# Patient Record
Sex: MALE | Race: WHITE | Employment: UNEMPLOYED | URBAN - METROPOLITAN AREA
[De-identification: names, ages, dates, MRNs, and addresses within clinical notes are randomized per-mention and may not be internally consistent; named-entity substitution may affect disease eponyms.]

---

## 2019-04-20 ENCOUNTER — APPOINTMENT (OUTPATIENT)
Dept: GENERAL RADIOLOGY | Age: 59
End: 2019-04-20

## 2019-04-20 ENCOUNTER — HOSPITAL ENCOUNTER (EMERGENCY)
Age: 59
Discharge: HOME | End: 2019-04-20
Attending: EMERGENCY MEDICINE

## 2019-04-20 VITALS
OXYGEN SATURATION: 96 % | DIASTOLIC BLOOD PRESSURE: 73 MMHG | HEIGHT: 66 IN | WEIGHT: 150 LBS | HEART RATE: 88 BPM | SYSTOLIC BLOOD PRESSURE: 131 MMHG | TEMPERATURE: 97.1 F | BODY MASS INDEX: 24.11 KG/M2 | RESPIRATION RATE: 11 BRPM

## 2019-04-20 DIAGNOSIS — R55 NEAR SYNCOPE: Primary | ICD-10-CM

## 2019-04-20 LAB
A/G RATIO: 1.7 (ref 1.1–2.2)
ALBUMIN SERPL-MCNC: 4.7 G/DL (ref 3.4–5)
ALP BLD-CCNC: 109 U/L (ref 40–129)
ALT SERPL-CCNC: 28 U/L (ref 10–40)
ANION GAP SERPL CALCULATED.3IONS-SCNC: 12 MMOL/L (ref 3–16)
AST SERPL-CCNC: 17 U/L (ref 15–37)
BASOPHILS ABSOLUTE: 0 K/UL (ref 0–0.2)
BASOPHILS RELATIVE PERCENT: 0.4 %
BILIRUB SERPL-MCNC: 0.7 MG/DL (ref 0–1)
BUN BLDV-MCNC: 18 MG/DL (ref 7–20)
CALCIUM SERPL-MCNC: 9.7 MG/DL (ref 8.3–10.6)
CHLORIDE BLD-SCNC: 98 MMOL/L (ref 99–110)
CO2: 28 MMOL/L (ref 21–32)
CREAT SERPL-MCNC: 0.9 MG/DL (ref 0.8–1.3)
EKG ATRIAL RATE: 69 BPM
EKG DIAGNOSIS: NORMAL
EKG P AXIS: 33 DEGREES
EKG P-R INTERVAL: 132 MS
EKG Q-T INTERVAL: 392 MS
EKG QRS DURATION: 86 MS
EKG QTC CALCULATION (BAZETT): 420 MS
EKG R AXIS: 16 DEGREES
EKG T AXIS: 32 DEGREES
EKG VENTRICULAR RATE: 69 BPM
EOSINOPHILS ABSOLUTE: 0.4 K/UL (ref 0–0.6)
EOSINOPHILS RELATIVE PERCENT: 4.5 %
GFR AFRICAN AMERICAN: >60
GFR NON-AFRICAN AMERICAN: >60
GLOBULIN: 2.8 G/DL
GLUCOSE BLD-MCNC: 271 MG/DL (ref 70–99)
HCT VFR BLD CALC: 45.7 % (ref 40.5–52.5)
HEMOGLOBIN: 15.9 G/DL (ref 13.5–17.5)
LYMPHOCYTES ABSOLUTE: 1.5 K/UL (ref 1–5.1)
LYMPHOCYTES RELATIVE PERCENT: 17.7 %
MAGNESIUM: 2.4 MG/DL (ref 1.8–2.4)
MCH RBC QN AUTO: 31.9 PG (ref 26–34)
MCHC RBC AUTO-ENTMCNC: 34.8 G/DL (ref 31–36)
MCV RBC AUTO: 91.8 FL (ref 80–100)
MONOCYTES ABSOLUTE: 0.9 K/UL (ref 0–1.3)
MONOCYTES RELATIVE PERCENT: 10.5 %
NEUTROPHILS ABSOLUTE: 5.5 K/UL (ref 1.7–7.7)
NEUTROPHILS RELATIVE PERCENT: 66.9 %
PDW BLD-RTO: 12.9 % (ref 12.4–15.4)
PLATELET # BLD: 200 K/UL (ref 135–450)
PMV BLD AUTO: 7.1 FL (ref 5–10.5)
POTASSIUM SERPL-SCNC: 4.7 MMOL/L (ref 3.5–5.1)
RBC # BLD: 4.97 M/UL (ref 4.2–5.9)
SODIUM BLD-SCNC: 138 MMOL/L (ref 136–145)
TOTAL PROTEIN: 7.5 G/DL (ref 6.4–8.2)
TROPONIN: <0.01 NG/ML
TROPONIN: <0.01 NG/ML
WBC # BLD: 8.3 K/UL (ref 4–11)

## 2019-04-20 PROCEDURE — 83735 ASSAY OF MAGNESIUM: CPT

## 2019-04-20 PROCEDURE — 71046 X-RAY EXAM CHEST 2 VIEWS: CPT

## 2019-04-20 PROCEDURE — 93005 ELECTROCARDIOGRAM TRACING: CPT | Performed by: EMERGENCY MEDICINE

## 2019-04-20 PROCEDURE — 85025 COMPLETE CBC W/AUTO DIFF WBC: CPT

## 2019-04-20 PROCEDURE — 80053 COMPREHEN METABOLIC PANEL: CPT

## 2019-04-20 PROCEDURE — 2580000003 HC RX 258: Performed by: PHYSICIAN ASSISTANT

## 2019-04-20 PROCEDURE — 84484 ASSAY OF TROPONIN QUANT: CPT

## 2019-04-20 PROCEDURE — 93010 ELECTROCARDIOGRAM REPORT: CPT | Performed by: INTERNAL MEDICINE

## 2019-04-20 PROCEDURE — 99284 EMERGENCY DEPT VISIT MOD MDM: CPT

## 2019-04-20 RX ORDER — ATORVASTATIN CALCIUM 10 MG/1
10 TABLET, FILM COATED ORAL DAILY
COMMUNITY

## 2019-04-20 RX ORDER — 0.9 % SODIUM CHLORIDE 0.9 %
1000 INTRAVENOUS SOLUTION INTRAVENOUS ONCE
Status: COMPLETED | OUTPATIENT
Start: 2019-04-20 | End: 2019-04-20

## 2019-04-20 RX ORDER — CYCLOBENZAPRINE HCL 10 MG
10 TABLET ORAL 3 TIMES DAILY PRN
Qty: 15 TABLET | Refills: 0 | Status: SHIPPED | OUTPATIENT
Start: 2019-04-20

## 2019-04-20 RX ADMIN — SODIUM CHLORIDE 1000 ML: 9 INJECTION, SOLUTION INTRAVENOUS at 06:32

## 2019-04-20 NOTE — ED NOTES
20g IV placed in right AC. Blood work collected and sent to lab. Orthostatic VS obtained. Supine P: 68 BP: 131/70   Standing P: 84 BP: 142/76  Pt medicated per MAR.       Zuhair Howell RN  04/20/19 9491

## 2019-04-20 NOTE — ED NOTES
Pt back from x-ray. Placed on continuous pulse ox, tele monitoring, and BP set to cycle. Pt resting in bed with bed locked in lowest position with side rails up x2. Pt denies needs at this time, will continue to monitor.       Destiney Lamas RN  04/20/19 3593

## 2019-04-20 NOTE — ED NOTES
Bed: 08  Expected date:   Expected time:   Means of arrival:   Comments:  Martin Schaffer RN  04/20/19 0908

## 2019-04-20 NOTE — ED PROVIDER NOTES
Triage Chief Complaint:   Dizziness (pt in from home via [facility] reporting vss, bg 208 DM II, EKG SR. pt c/o feeling faint and dizzy for 5-10 mins no LOC )    [place]:  Jeremie Gomes is a 59 y.o. male that presents episode of lightheadedness and dizziness. Patient reports that he got up to use the restroom and after a few minutes of after getting up he felt like he was going to pass out. He felt very lightheaded and dizzy. He did not have any actual syncopal episode. He did manage to walk back upstairs to get his phone and called EMS. Symptoms only lasted 5-10 minutes at most and self resolved. He did not have any associated headache, vision changes, chest pain shortness of breath numbness or tingling. He does report that for the past several days she's been having left-sided neck and posterior shoulder/upper back pain. He reports that he's been using a lot recently and thought it was musculoskeletal related. It had been relieved with heating pads and ibuprofen. Denies any symptoms at this time. Has a history of hypertension, hyperlipidemia and diabetes. Denies any other recent travel or sick contacts. Denies fever, chills, cough, she has breath, chest pain, abdominal pain, nausea, vomiting, joint pain other than mentioned above, numbness, tingling, focal weakness or other associated signs or symptoms. ROS:  At least 10 systems reviewed and otherwise negative except as in the [address] PAST MEDICAL HISTORY/SURGICAL HISTORY    Past Medical History:   Diagnosis Date    Diabetes mellitus ([facility] [address]     Hemochromatosis     Hyperlipidemia     Hypertension      History reviewed. No pertinent surgical history.     CURRENT MEDICATIONS    Current Outpatient Rx   Medication Sig Dispense Refill    atorvastatin (LIPITOR) 10 MG tablet Take 10 mg by mouth daily      Escitalopram Oxalate (LEXAPRO PO) Take by mouth      Canagliflozin (INVOKANA PO) Take by mouth         ALLERGIES    No Known Allergies    FAMILY HISTORY/SOCIAL HISTORY    History reviewed. No pertinent family history. Social History     Socioeconomic History    Marital status: Single     Spouse name: None    Number of children: None    Years of education: None    Highest education level: None   Occupational History    None   Social Needs    Financial resource strain: None    Food insecurity:     Worry: None     Inability: None    Transportation needs:     Medical: None     Non-medical: None   Tobacco Use    Smoking status: Never Smoker    Smokeless tobacco: Never Used   Substance and Sexual Activity    Alcohol use: Yes    Drug use: Never    Sexual activity: None   Lifestyle    Physical activity:     Days per week: None     Minutes per session: None    Stress: None   Relationships    Social connections:     Talks on phone: None     Gets together: None     Attends Mosque service: None     Active member of club or organization: None     Attends meetings of clubs or organizations: None     Relationship status: None    Intimate partner violence:     Fear of current or ex partner: None     Emotionally abused: None     Physically abused: None     Forced sexual activity: None   Other Topics Concern    None   Social History Narrative    None       Nursing Notes Reviewed    Physical Exam:  ED Triage Vitals [04/20/19 0609]   Enc Vitals Group      BP (!) 146/77      Pulse 73      Resp 18      Temp 97.1 °F (36.2 °C)      Temp src       SpO2 98 %      Weight 150 lb (68 kg)      Height 5' 6\" (1.676 m)      Head Circumference       Peak Flow       Pain Score       Pain Loc       Pain Edu? Excl. in [address]? GENERAL APPEARANCE: Awake and alert. Cooperative. No acute distress. HEAD: Normocephalic. Atraumatic. EYES: EOM's grossly intact. Sclera anicteric. PERRLA  ENT: Mucous membranes are moist. Tolerates saliva. No trismus. Normal nose, patent airway, nonerythematous TMs and EACs  NECK: Supple. No meningismus. Trachea midline.  No midline ALT 28 10 - 40 U/L    AST 17 15 - 37 U/L    Globulin 2.8 g/dL   Magnesium   Result Value Ref Range    Magnesium 2.40 1.80 - 2.40 mg/dL   Troponin   Result Value Ref Range    Troponin <0.01 <0.01 ng/mL   Troponin   Result Value Ref Range    Troponin <0.01 <0.01 ng/mL   EKG 12 Lead   Result Value Ref Range    Ventricular Rate 69 BPM    Atrial Rate 69 BPM    P-R Interval 132 ms    QRS Duration 86 ms    Q-T Interval 392 ms    QTc Calculation (Bazett) 420 ms    P Axis 33 degrees    R Axis 16 degrees    T Axis 32 degrees    Diagnosis Normal sinus rhythmNormal ECG         Radiographs (if obtained):  XR CHEST STANDARD (2 VW)   Final Result   No acute process. Chart review shows recent radiographs:  No results found. EKG (if obtained): See Dr. Skinny Heredia note for EKG interpretation. MDM/ED COURSE:    Supervising physician: Dr. Steve Zapata    CBC is unremarkable. CMP shows glucose of 271. Magnesium 2.4. Troponin negative. CXR shows no acute cardiopulmonary abnormalities. EKG shows no acute ischemia. Orthostatic vitals: Supine P: 68 BP: 131/70, Standing P: 84 BP: 142/76. Patient given IV fluids while in the ED. Patient feeling better after IV fluids. Repeat 3 hour troponin is negative. Patient's VSS, neurologically intact. He will be discharged home and is to have close follow up with PCP. He's to return if new/worsening symptoms develop. Patient voiced understanding and is in agreement. I estimate there is LOW risk for INTRACRANIAL HEMORRHAGE, ISCHEMIC CVA,  MENINGITIS, ACUTE CORONARY SYNDROME, MALIGNANT DYSRHYTHMIA, PULMONARY EMBOLISM, PNEUMONIA or SEPSIS, thus I consider the discharge disposition reasonable. Roro Victor (or their surrogate) and I have discussed the diagnosis and risks, and we agree with discharging home with close follow-up. We also discussed returning to the Emergency Department immediately if new or worsening symptoms occur.  We have discussed the symptoms which are most concerning

## 2019-04-20 NOTE — ED NOTES
Pt Discharged in stable condition, VSS, no signs of distress, discharge instructions and meds reviewed. Pt verbalizes understanding and states no further questions or concerns unaddressed.        Wells Claude, RN  04/20/19 3643

## 2019-04-20 NOTE — ED PROVIDER NOTES
I independently examined and evaluated [facility]. In brief history of present illness revealed 58-year-old male who presents with an episode of near syncope. Patient states for the last 2 days he has felt muscle pain to the left posterior lateral neck and left trapezius area. He woke up this morning and went to the bathroom but states he had significant pain to the left trapezius area. He felt lightheaded like he was going to pass out. He states his vision was blurry and he felt disoriented. He was able to get back to his room to get his phone and call 911. States symptoms lasted 7-10 minutes and resolved without intervention. Blood sugar was not low for EMS. He had no other associated symptoms and denies chest pain, palpitations, shortness of breath, focal weakness or numbness, nausea, diaphoresis. Physical examination revealed a well-appearing male who is awake, alert and in no acute distress. Alert with fluent speech. No facial drooping. Regular rate and rhythm. Clear to auscultation bilaterally. Skin warm and dry. Abdomen soft, nontender, nondistended without rebound. Tenderness to palpation to the left trapezius.      I have reviewed and interpreted all of the currently available lab results from this visit:  Results for orders placed or performed during the hospital encounter of 04/20/19   CBC Auto Differential   Result Value Ref Range    WBC 8.3 4.0 - 11.0 K/uL    RBC 4.97 4.20 - 5.90 M/uL    Hemoglobin 15.9 13.5 - 17.5 g/dL    Hematocrit 45.7 40.5 - 52.5 %    MCV 91.8 80.0 - 100.0 fL    MCH 31.9 26.0 - 34.0 pg    MCHC 34.8 31.0 - 36.0 g/dL    RDW 12.9 12.4 - 15.4 %    Platelets 352 185 - 050 K/uL    MPV 7.1 5.0 - 10.5 fL    Neutrophils % 66.9 %    Lymphocytes % 17.7 %    Monocytes % 10.5 %    Eosinophils % 4.5 %    Basophils % 0.4 %    Neutrophils # 5.5 1.7 - 7.7 K/uL    Lymphocytes # 1.5 1.0 - 5.1 K/uL    Monocytes # 0.9 0.0 - 1.3 K/uL    Eosinophils # 0.4 0.0 - 0.6 K/uL Basophils # 0.0 0.0 - 0.2 K/uL   Comprehensive Metabolic Panel   Result Value Ref Range    Sodium 138 136 - 145 mmol/L    Potassium 4.7 3.5 - 5.1 mmol/L    Chloride 98 (L) 99 - 110 mmol/L    CO2 28 21 - 32 mmol/L    Anion Gap 12 3 - 16    Glucose 271 (H) 70 - 99 mg/dL    BUN 18 7 - 20 mg/dL    CREATININE 0.9 0.8 - 1.3 mg/dL    GFR Non-African American >60 >60    GFR African American >60 >60    Calcium 9.7 8.3 - 10.6 mg/dL    Total Protein 7.5 6.4 - 8.2 g/dL    Alb 4.7 3.4 - 5.0 g/dL    Albumin/Globulin Ratio 1.7 1.1 - 2.2    Total Bilirubin 0.7 0.0 - 1.0 mg/dL    Alkaline Phosphatase 109 40 - 129 U/L    ALT 28 10 - 40 U/L    AST 17 15 - 37 U/L    Globulin 2.8 g/dL   Magnesium   Result Value Ref Range    Magnesium 2.40 1.80 - 2.40 mg/dL   Troponin   Result Value Ref Range    Troponin <0.01 <0.01 ng/mL   Troponin   Result Value Ref Range    Troponin <0.01 <0.01 ng/mL   EKG 12 Lead   Result Value Ref Range    Ventricular Rate 69 BPM    Atrial Rate 69 BPM    P-R Interval 132 ms    QRS Duration 86 ms    Q-T Interval 392 ms    QTc Calculation (Bazett) 420 ms    P Axis 33 degrees    R Axis 16 degrees    T Axis 32 degrees    Diagnosis Normal sinus rhythmNormal ECG      XR CHEST STANDARD (2 VW) (Final result)   Result time 04/20/19 06:58:54   Final result by Virgilio Wang MD (04/20/19 06:58:54)                Impression:    No acute process. EKG per my interpretation shows normal sinus rhythm at 69 bpm.  Normal axis. Normal intervals. No ectopy. No ST or T-wave changes. No previous EKG for comparison. Before disposition, questions were sought and answered with the patient. They have voiced understanding and agree with the plan. All diagnostic, treatment, and disposition decisions were made by myself in conjunction with the advanced practice provider. For all further details of the patient's emergency department visit, please see the advanced practitioner's documentation.        Brianna Pierre

## 2019-04-20 NOTE — ED NOTES
Pt sitting in bed quietly. VSS. States, \"I feel a whole lot better now. \" will continue to monitor.       Jacques Ledbetter RN  04/20/19 8124

## 2019-04-20 NOTE — ED NOTES
Assumed care of patient. Alert and oriented. Denies any symptoms of distress or discomfort. Denies dizziness, and or chest pain. Vitals stable at this time. Lungs CtAB. SR on monitor with no ST elevation or ectopy noted. Will continue to monitor and will redraw trop.       Toñito Sanchez RN  04/20/19 3424